# Patient Record
Sex: FEMALE | Race: WHITE | Employment: UNEMPLOYED | URBAN - METROPOLITAN AREA
[De-identification: names, ages, dates, MRNs, and addresses within clinical notes are randomized per-mention and may not be internally consistent; named-entity substitution may affect disease eponyms.]

---

## 2021-07-14 ENCOUNTER — HOSPITAL ENCOUNTER (OUTPATIENT)
Age: 36
Discharge: HOME | End: 2021-07-14
Attending: STUDENT IN AN ORGANIZED HEALTH CARE EDUCATION/TRAINING PROGRAM | Admitting: OBSTETRICS & GYNECOLOGY
Payer: COMMERCIAL

## 2021-07-14 VITALS
HEIGHT: 67 IN | DIASTOLIC BLOOD PRESSURE: 72 MMHG | RESPIRATION RATE: 16 BRPM | TEMPERATURE: 98.4 F | HEART RATE: 104 BPM | WEIGHT: 235 LBS | SYSTOLIC BLOOD PRESSURE: 123 MMHG | BODY MASS INDEX: 36.88 KG/M2

## 2021-07-14 PROBLEM — Z3A.21 21 WEEKS GESTATION OF PREGNANCY: Status: ACTIVE | Noted: 2021-07-14

## 2021-07-14 PROCEDURE — 99282 EMERGENCY DEPT VISIT SF MDM: CPT

## 2021-07-14 RX ORDER — GUAIFENESIN 100 MG/5ML
81 LIQUID (ML) ORAL DAILY
COMMUNITY

## 2021-07-14 NOTE — PROGRESS NOTES
1620  @ 21.2 weeks arrived from home with c/o lower left abdominal pain when walking and standing. To bathroom to void, ambulated to bed and connected to EFM. [address]. Morrison Given at nurses station, informed of pt complaint, discharge orders received. [id number] Discharge instructions given, pt verbalized understanding.

## 2021-07-14 NOTE — DISCHARGE INSTRUCTIONS
Patient armband removed and shredded      Patient Education        Weeks 18 to 22 of Your Pregnancy: Care Instructions  Overview     Your baby is continuing to develop quickly. Sometime between 18 and 22 weeks, you'll probably start to feel your baby move. At first, these small fetal movements feel like fluttering or \"butterflies. \" Some women say that they feel like gas bubbles. As your baby grows, these movements will become stronger. You may also notice that your baby hiccups. Babies at this stage can now suck their thumbs. You may find that your nausea and fatigue are gone. You may feel better overall and have more energy than you did in your first trimester. But you might now also have some new discomforts, like sleep problems or leg cramps. Talk to your doctor about things you can do at home to ease these problems. Follow-up care is a key part of your treatment and safety. Be sure to make and go to all appointments, and call your doctor if you are having problems. It's also a good idea to know your test results and keep a list of the medicines you take. How can you care for yourself at home? Ease sleep problems  · Avoid caffeine in drinks or chocolate late in the day. · Get some exercise every day. · Take a warm shower or bath before bed. · Have a light snack or glass of milk at bedtime. · Do relaxation exercises in bed to calm your mind and body. · Support your legs and back with extra pillows. Try a pillow between your legs if you sleep on your side. · Do not use sleeping pills or alcohol. They could harm your baby. Ease leg cramps  · Do not massage your calf during the cramp. · Sit on a firm bed or chair. Straighten your leg, and bend your foot (flex your ankle) slowly upward, toward your knee. Bend your toes up and down. · Stand on a cool, flat surface. Stretch your toes upward, and take small steps walking on your heels.   · Use a heating pad or hot water bottle to help with muscle ache.  Prevent leg cramps  · Be sure to get enough calcium. If you are worried that you are not getting enough, talk to your doctor. · Exercise every day, and stretch your legs before bed. · Take a warm bath before bed, and try leg warmers at night. Where can you learn more? Go to http://www.ivy.com/  Enter Q846 in the search box to learn more about \"Weeks 18 to 22 of Your Pregnancy: Care Instructions. \"  Current as of: October 8, 2020               Content Version: 12.8  © 0356-9583 [employer]. Care instructions adapted under license by [employer] (which disclaims liability or warranty for this information). If you have questions about a medical condition or this instruction, always ask your healthcare professional. [address] any warranty or liability for your use of this information.

## 2021-11-29 ENCOUNTER — HOSPITAL ENCOUNTER (INPATIENT)
Age: 36
LOS: 4 days | Discharge: HOME | End: 2021-12-03
Attending: OBSTETRICS & GYNECOLOGY | Admitting: OBSTETRICS & GYNECOLOGY
Payer: COMMERCIAL

## 2021-11-29 PROBLEM — Z3A.41 41 WEEKS GESTATION OF PREGNANCY: Status: ACTIVE | Noted: 2021-11-29

## 2021-11-29 PROBLEM — O48.0 41 WEEKS GESTATION OF PREGNANCY: Status: ACTIVE | Noted: 2021-11-29

## 2021-11-29 PROBLEM — E66.9 OBESITY: Status: ACTIVE | Noted: 2021-11-29

## 2021-11-29 PROBLEM — Z82.69 FAMILY HISTORY OF SYSTEMIC LUPUS ERYTHEMATOSUS: Status: ACTIVE | Noted: 2021-11-29

## 2021-11-29 LAB
ABO + RH BLD: NORMAL
ALBUMIN SERPL-MCNC: 2.4 G/DL (ref 3.4–5)
ALBUMIN/GLOB SERPL: 0.6 {RATIO} (ref 0.8–1.7)
ALP SERPL-CCNC: 105 U/L (ref 45–117)
ALT SERPL-CCNC: 25 U/L (ref 13–56)
ANION GAP SERPL CALC-SCNC: 9 MMOL/L (ref 3–18)
AST SERPL-CCNC: 21 U/L (ref 10–38)
BASOPHILS # BLD: 0 K/UL (ref 0–0.1)
BASOPHILS NFR BLD: 0 % (ref 0–2)
BILIRUB SERPL-MCNC: 0.2 MG/DL (ref 0.2–1)
BLOOD GROUP ANTIBODIES SERPL: NORMAL
BUN SERPL-MCNC: 12 MG/DL (ref 7–18)
BUN/CREAT SERPL: 16 (ref 12–20)
CALCIUM SERPL-MCNC: 9 MG/DL (ref 8.5–10.1)
CHLORIDE SERPL-SCNC: 107 MMOL/L (ref 100–111)
CO2 SERPL-SCNC: 23 MMOL/L (ref 21–32)
CREAT SERPL-MCNC: 0.75 MG/DL (ref 0.6–1.3)
DIFFERENTIAL METHOD BLD: ABNORMAL
EOSINOPHIL # BLD: 0.1 K/UL (ref 0–0.4)
EOSINOPHIL NFR BLD: 1 % (ref 0–5)
ERYTHROCYTE [DISTWIDTH] IN BLOOD BY AUTOMATED COUNT: 13.7 % (ref 11.6–14.5)
GLOBULIN SER CALC-MCNC: 3.9 G/DL (ref 2–4)
GLUCOSE SERPL-MCNC: 84 MG/DL (ref 74–99)
HCT VFR BLD AUTO: 36.6 % (ref 35–45)
HGB BLD-MCNC: 12.2 G/DL (ref 12–16)
IMM GRANULOCYTES # BLD AUTO: 0.1 K/UL (ref 0–0.04)
IMM GRANULOCYTES NFR BLD AUTO: 1 % (ref 0–0.5)
LYMPHOCYTES # BLD: 1.6 K/UL (ref 0.9–3.6)
LYMPHOCYTES NFR BLD: 14 % (ref 21–52)
MCH RBC QN AUTO: 31.9 PG (ref 24–34)
MCHC RBC AUTO-ENTMCNC: 33.3 G/DL (ref 31–37)
MCV RBC AUTO: 95.8 FL (ref 78–100)
MONOCYTES # BLD: 0.5 K/UL (ref 0.05–1.2)
MONOCYTES NFR BLD: 5 % (ref 3–10)
NEUTS SEG # BLD: 8.7 K/UL (ref 1.8–8)
NEUTS SEG NFR BLD: 79 % (ref 40–73)
NRBC # BLD: 0 K/UL (ref 0–0.01)
NRBC BLD-RTO: 0 PER 100 WBC
PLATELET # BLD AUTO: 186 K/UL (ref 135–420)
PMV BLD AUTO: 11.5 FL (ref 9.2–11.8)
POTASSIUM SERPL-SCNC: 4.2 MMOL/L (ref 3.5–5.5)
PROT SERPL-MCNC: 6.3 G/DL (ref 6.4–8.2)
RBC # BLD AUTO: 3.82 M/UL (ref 4.2–5.3)
SODIUM SERPL-SCNC: 139 MMOL/L (ref 136–145)
SPECIMEN EXP DATE BLD: NORMAL
WBC # BLD AUTO: 10.9 K/UL (ref 4.6–13.2)

## 2021-11-29 PROCEDURE — 74011000258 HC RX REV CODE- 258: Performed by: OBSTETRICS & GYNECOLOGY

## 2021-11-29 PROCEDURE — 65270000029 HC RM PRIVATE

## 2021-11-29 PROCEDURE — 86901 BLOOD TYPING SEROLOGIC RH(D): CPT

## 2021-11-29 PROCEDURE — 85025 COMPLETE CBC W/AUTO DIFF WBC: CPT

## 2021-11-29 PROCEDURE — 80053 COMPREHEN METABOLIC PANEL: CPT

## 2021-11-29 PROCEDURE — 77030028565 HC CATH CERV RIPNG BLN COOK -B

## 2021-11-29 PROCEDURE — 74011250636 HC RX REV CODE- 250/636: Performed by: OBSTETRICS & GYNECOLOGY

## 2021-11-29 RX ORDER — OXYTOCIN/0.9 % SODIUM CHLORIDE 30/500 ML
0-20 PLASTIC BAG, INJECTION (ML) INTRAVENOUS
Status: DISCONTINUED | OUTPATIENT
Start: 2021-11-29 | End: 2021-11-30

## 2021-11-29 RX ORDER — OXYTOCIN/0.9 % SODIUM CHLORIDE 30/500 ML
0-20 PLASTIC BAG, INJECTION (ML) INTRAVENOUS
Status: DISCONTINUED | OUTPATIENT
Start: 2021-11-29 | End: 2021-11-29

## 2021-11-29 RX ORDER — HYDROMORPHONE HYDROCHLORIDE 1 MG/ML
1 INJECTION, SOLUTION INTRAMUSCULAR; INTRAVENOUS; SUBCUTANEOUS
Status: DISCONTINUED | OUTPATIENT
Start: 2021-11-29 | End: 2021-12-01 | Stop reason: HOSPADM

## 2021-11-29 RX ORDER — TERBUTALINE SULFATE 1 MG/ML
0.25 INJECTION SUBCUTANEOUS
Status: DISCONTINUED | OUTPATIENT
Start: 2021-11-29 | End: 2021-12-01 | Stop reason: HOSPADM

## 2021-11-29 RX ORDER — MINERAL OIL
30 OIL (ML) MISCELLANEOUS AS NEEDED
Status: DISCONTINUED | OUTPATIENT
Start: 2021-11-29 | End: 2021-12-01 | Stop reason: HOSPADM

## 2021-11-29 RX ORDER — OXYTOCIN/RINGER'S LACTATE 30/500 ML
10 PLASTIC BAG, INJECTION (ML) INTRAVENOUS AS NEEDED
Status: DISCONTINUED | OUTPATIENT
Start: 2021-11-29 | End: 2021-12-01 | Stop reason: HOSPADM

## 2021-11-29 RX ORDER — LIDOCAINE HYDROCHLORIDE 10 MG/ML
20 INJECTION, SOLUTION EPIDURAL; INFILTRATION; INTRACAUDAL; PERINEURAL AS NEEDED
Status: DISCONTINUED | OUTPATIENT
Start: 2021-11-29 | End: 2021-12-01 | Stop reason: HOSPADM

## 2021-11-29 RX ORDER — OXYTOCIN/0.9 % SODIUM CHLORIDE 30/500 ML
87.3 PLASTIC BAG, INJECTION (ML) INTRAVENOUS AS NEEDED
Status: COMPLETED | OUTPATIENT
Start: 2021-11-29 | End: 2021-12-01

## 2021-11-29 RX ORDER — NALBUPHINE HYDROCHLORIDE 10 MG/ML
10 INJECTION, SOLUTION INTRAMUSCULAR; INTRAVENOUS; SUBCUTANEOUS
Status: DISCONTINUED | OUTPATIENT
Start: 2021-11-29 | End: 2021-12-01 | Stop reason: HOSPADM

## 2021-11-29 RX ORDER — BUTORPHANOL TARTRATE 2 MG/ML
2 INJECTION INTRAMUSCULAR; INTRAVENOUS
Status: DISCONTINUED | OUTPATIENT
Start: 2021-11-29 | End: 2021-12-01 | Stop reason: HOSPADM

## 2021-11-29 RX ORDER — METHYLERGONOVINE MALEATE 0.2 MG/ML
0.2 INJECTION INTRAVENOUS AS NEEDED
Status: DISCONTINUED | OUTPATIENT
Start: 2021-11-29 | End: 2021-12-01 | Stop reason: HOSPADM

## 2021-11-29 RX ORDER — SODIUM CHLORIDE, SODIUM LACTATE, POTASSIUM CHLORIDE, CALCIUM CHLORIDE 600; 310; 30; 20 MG/100ML; MG/100ML; MG/100ML; MG/100ML
125 INJECTION, SOLUTION INTRAVENOUS CONTINUOUS
Status: DISCONTINUED | OUTPATIENT
Start: 2021-11-29 | End: 2021-12-01 | Stop reason: HOSPADM

## 2021-11-29 RX ORDER — ONDANSETRON 2 MG/ML
4 INJECTION INTRAMUSCULAR; INTRAVENOUS
Status: DISCONTINUED | OUTPATIENT
Start: 2021-11-29 | End: 2021-12-01 | Stop reason: HOSPADM

## 2021-11-29 RX ADMIN — SODIUM CHLORIDE, POTASSIUM CHLORIDE, SODIUM LACTATE AND CALCIUM CHLORIDE 125 ML/HR: 600; 310; 30; 20 INJECTION, SOLUTION INTRAVENOUS at 09:17

## 2021-11-29 RX ADMIN — SODIUM CHLORIDE 2.5 MILLION UNITS: 9 INJECTION, SOLUTION INTRAVENOUS at 18:16

## 2021-11-29 RX ADMIN — SODIUM CHLORIDE 5 MILLION UNITS: 900 INJECTION INTRAVENOUS at 09:13

## 2021-11-29 RX ADMIN — Medication 6 MILLI-UNITS/MIN: at 10:13

## 2021-11-29 RX ADMIN — SODIUM CHLORIDE, POTASSIUM CHLORIDE, SODIUM LACTATE AND CALCIUM CHLORIDE 125 ML/HR: 600; 310; 30; 20 INJECTION, SOLUTION INTRAVENOUS at 15:28

## 2021-11-29 RX ADMIN — SODIUM CHLORIDE, POTASSIUM CHLORIDE, SODIUM LACTATE AND CALCIUM CHLORIDE 125 ML/HR: 600; 310; 30; 20 INJECTION, SOLUTION INTRAVENOUS at 14:20

## 2021-11-29 RX ADMIN — SODIUM CHLORIDE 2.5 MILLION UNITS: 9 INJECTION, SOLUTION INTRAVENOUS at 22:24

## 2021-11-29 RX ADMIN — SODIUM CHLORIDE 2.5 MILLION UNITS: 9 INJECTION, SOLUTION INTRAVENOUS at 13:37

## 2021-11-29 NOTE — PROGRESS NOTES
Pt presents to unit for scheduled induction of labor. Taken to room 8 for admission. 1020- Charting reviewed and discussed for LESLIE Eng RN.     6312- Message left with Dr. Melissa Banegas for review of EFM prior to restarting Pitocin. 1910- Bedside and Verbal shift change report given to GILSON Domínguez RN (oncoming nurse) by Kaye Rojas RN and LESLIE Eng RN (offgoing nurse). Report included the following information SBAR, Intake/Output, MAR and Recent Results.

## 2021-11-29 NOTE — PROGRESS NOTES
Cx=1-2 cm, still high  40%    Attempted to place ALT-DIETMANNS cath but I think it is pushing through cx after internal ballon  Unsuccessful    Will run low dose pit overnight    JONATHAN Buitrago

## 2021-11-29 NOTE — H&P
Ostetrical History and Physical    Subjective:     Date of Admission: 2021    Patient is a 43 y.o.   female admitted with 39 w preg. FH SLE and she was pos for 2/3 tests. Treated with baby ASA thru preg. Obesity. Treated for syphillis early preg, NOT allergic to penicillin by testing. .60% growth last US    For Obstetric history, see ronniantal.    For Review of Systems, see prenatal    Past Medical History:   Diagnosis Date    Abnormal Papanicolaou smear of cervix     Heart abnormality     heart murmur      No past surgical history on file. Prior to Admission medications    Medication Sig Start Date End Date Taking? Authorizing Provider   prenatal vit/iron fum/folic ac (PRENATAL 1+1 PO) Take 1 Tablet by mouth daily. Yes Provider, Historical   aspirin 81 mg chewable tablet Take 81 mg by mouth daily. Yes Provider, Historical     Allergies   Allergen Reactions    Codeine Other (comments)     Cannot urinate    Pcn [Penicillins] Hives     Pt states not allergic      Social History     Tobacco Use    Smoking status: Never Smoker    Smokeless tobacco: Never Used   Substance Use Topics    Alcohol use: Not Currently      No family history on file. Objective:     Blood pressure (!) 132/90, pulse 86, temperature 98.1 °F (36.7 °C), resp. rate 16, height 5' 7\" (1.702 m), weight 119.3 kg (263 lb), SpO2 98 %. Temp (24hrs), Av.1 °F (36.7 °C), Min:98.1 °F (36.7 °C), Max:98.1 °F (36.7 °C)        No intake/output data recorded. No intake/output data recorded. HEENT: No pallor, no jaundice, Thyroid and throat normal  RESPIRATORY: Clear to A & P  CVS: pulse reg, HS normal  ABDOMEN: Gravid. Vertex. EFW=8lb +-1lb. No abnormal tenderness. Pelvic: Cervix 1,   Effaced: 10%  Station:  -3  Data Review:   No results found for this or any previous visit (from the past 24 hour(s)). Monitor:  Reactivity:present Variability:present Baseline:within normal limits. Single late decel Uncertin signif.  Will use pit for induction therefore. Assessment:     Active Problems:    41 weeks gestation of pregnancy (11/29/2021)      Obesity (11/29/2021)      Family history of systemic lupus erythematosus (11/29/2021)        Plan:   Pit induction    Check labs:  CBC  CMP  Check  Prenatal:    Disposition:     Type of admit:In-Patient    I saw and examined patient.     Signed By: Rosaura Puckett MD                         November 29, 2021

## 2021-11-29 NOTE — PROGRESS NOTES
0745: Admission assessment completed. 0800: Dr. Horvath Form at bedside. 0935: Pt turned to left side. 1015: EFM readjusted, pitocin started. 1100: Pt up to bathroom. Turned to right side, monitors readjusted. 1320: Pt on labor ball. [place] and EFM readjusted. 1334: Pt back in bed on right side. 1345: Pitocin stopped. IV bolus started. 1528: Pitocin restarted at 4mg per MD orders. Pt repositioned. 1745: Dr. Horvath Form at bedside. Attempted to place cook balloon. 1800: Plan to start Pitocin at 4-6mg overnight and reassess. 1845: Pt eating dinner. 1920: Bedside and Verbal shift change report given to GILSON Domínguez RN (oncoming nurse) by Rodo Bustamante. TABATHA Rojas and LESLIE Roach RN (offgoing nurse). Report included the following information SBAR, Intake/Output, MAR and Recent Results.

## 2021-11-30 PROCEDURE — 77030028565 HC CATH CERV RIPNG BLN COOK -B

## 2021-11-30 PROCEDURE — 75410000002 HC LABOR FEE PER 1 HR

## 2021-11-30 PROCEDURE — 74011000258 HC RX REV CODE- 258: Performed by: OBSTETRICS & GYNECOLOGY

## 2021-11-30 PROCEDURE — 65270000029 HC RM PRIVATE

## 2021-11-30 PROCEDURE — 59200 INSERT CERVICAL DILATOR: CPT

## 2021-11-30 PROCEDURE — 74011250636 HC RX REV CODE- 250/636: Performed by: OBSTETRICS & GYNECOLOGY

## 2021-11-30 PROCEDURE — 74011250637 HC RX REV CODE- 250/637: Performed by: OBSTETRICS & GYNECOLOGY

## 2021-11-30 RX ORDER — OXYTOCIN/0.9 % SODIUM CHLORIDE 30/500 ML
2 PLASTIC BAG, INJECTION (ML) INTRAVENOUS
Status: DISCONTINUED | OUTPATIENT
Start: 2021-12-01 | End: 2021-12-01

## 2021-11-30 RX ORDER — TERCONAZOLE 4 MG/G
1 CREAM VAGINAL DAILY
Status: DISCONTINUED | OUTPATIENT
Start: 2021-11-30 | End: 2021-12-02

## 2021-11-30 RX ADMIN — SODIUM CHLORIDE, POTASSIUM CHLORIDE, SODIUM LACTATE AND CALCIUM CHLORIDE 500 ML: 600; 310; 30; 20 INJECTION, SOLUTION INTRAVENOUS at 15:12

## 2021-11-30 RX ADMIN — SODIUM CHLORIDE 2.5 MILLION UNITS: 9 INJECTION, SOLUTION INTRAVENOUS at 05:43

## 2021-11-30 RX ADMIN — SODIUM CHLORIDE, POTASSIUM CHLORIDE, SODIUM LACTATE AND CALCIUM CHLORIDE 125 ML/HR: 600; 310; 30; 20 INJECTION, SOLUTION INTRAVENOUS at 01:35

## 2021-11-30 RX ADMIN — SODIUM CHLORIDE, POTASSIUM CHLORIDE, SODIUM LACTATE AND CALCIUM CHLORIDE 125 ML/HR: 600; 310; 30; 20 INJECTION, SOLUTION INTRAVENOUS at 16:37

## 2021-11-30 RX ADMIN — TERCONAZOLE 1 APPLICATOR: 4 CREAM VAGINAL at 09:33

## 2021-11-30 RX ADMIN — SODIUM CHLORIDE 2.5 MILLION UNITS: 9 INJECTION, SOLUTION INTRAVENOUS at 01:41

## 2021-11-30 RX ADMIN — SODIUM CHLORIDE 2.5 MILLION UNITS: 9 INJECTION, SOLUTION INTRAVENOUS at 19:14

## 2021-11-30 RX ADMIN — BUTORPHANOL TARTRATE 2 MG: 2 INJECTION, SOLUTION INTRAMUSCULAR; INTRAVENOUS at 08:25

## 2021-11-30 RX ADMIN — BUTORPHANOL TARTRATE 2 MG: 2 INJECTION, SOLUTION INTRAMUSCULAR; INTRAVENOUS at 14:51

## 2021-11-30 RX ADMIN — SODIUM CHLORIDE 2.5 MILLION UNITS: 9 INJECTION, SOLUTION INTRAVENOUS at 10:41

## 2021-11-30 RX ADMIN — SODIUM CHLORIDE, POTASSIUM CHLORIDE, SODIUM LACTATE AND CALCIUM CHLORIDE 125 ML/HR: 600; 310; 30; 20 INJECTION, SOLUTION INTRAVENOUS at 10:23

## 2021-11-30 RX ADMIN — SODIUM CHLORIDE 2.5 MILLION UNITS: 9 INJECTION, SOLUTION INTRAVENOUS at 14:49

## 2021-11-30 NOTE — PROGRESS NOTES
Problem: Patient Education: Go to Patient Education Activity  Goal: Patient/Family Education  Outcome: Progressing Towards Goal     Problem: Vaginal Delivery: Day of Deliver-Laboring  Goal: Off Pathway (Use only if patient is Off Pathway)  Outcome: Progressing Towards Goal  Goal: Activity/Safety  Outcome: Progressing Towards Goal  Goal: Consults, if ordered  Outcome: Progressing Towards Goal  Goal: Diagnostic Test/Procedures  Outcome: Progressing Towards Goal  Goal: Nutrition/Diet  Outcome: Progressing Towards Goal  Goal: Discharge Planning  Outcome: Progressing Towards Goal  Goal: Medications  Outcome: Progressing Towards Goal  Goal: Respiratory  Outcome: Progressing Towards Goal  Goal: Treatments/Interventions/Procedures  Outcome: Progressing Towards Goal  Goal: *Vital signs within defined limits  Outcome: Progressing Towards Goal  Goal: *Labs within defined limits  Outcome: Progressing Towards Goal  Goal: *Hemodynamically stable  Outcome: Progressing Towards Goal  Goal: *Optimal pain control at patient's stated goal  Outcome: Progressing Towards Goal     Problem: Pain  Goal: *Control of Pain  Outcome: Progressing Towards Goal

## 2021-11-30 NOTE — PROGRESS NOTES
Bedside and Verbal shift change report given to NANNETTE Rojas RN and LESLIE Manzanares (oncoming nurse) by Isabelle Silva RN (offgoing nurse). Report included the following information SBAR, Intake/Output, MAR and Recent Results. 0720: Shift assessment completed. Pt repositioned to high fowlers. 0815: Dr. Magda Evans at bedside     0825: Stadol given    0830: Roland Specter balloon placed. Pt repositioned on left side with peanut ball. 0930: Pt up to restroom. 0945: Pt back in bed, positioned on left side. 1045: Pt up to bathroom, able to void without deflating the vaginal balloon. 1052: Pt assisted into hands and knees position on bed. [place] adjusted. 1115: Repositioned onto right side. EFM adjusted. 1250: Pt turned to left side. Monitors adjusted. 1330: Pt sitting up in chair. Monitors adjusted. 1400: Back in bed on right side. Heat pad applied to lower back. 1425: Pt in hands and knees position on bed. EFM and [place] adjusted.

## 2021-11-30 NOTE — PROGRESS NOTES
Bedside and Verbal shift change report given to NANNETTE Rojas RN and LESLIE Jose (oncoming nurse) by Jennifer Griffith RN (offgoing nurse). Report included the following information SBAR, Intake/Output, MAR and Recent Results. 9075- Charting reviewed and discussed for MAINELottie Rickettsiamarlon, [address]- Pt unable to void initially. Tension released and 15ml deflated from vaginal balloon. Now able to void and re inflated/resecured once back in bed. 1512- Decelerations noted. Pitocin off, IV bolus infusing and patient turned right lateral side. 1915- Bedside and Verbal shift change report given to NAA Padilla RN (oncoming nurse) by Cristina Rojas RN (offgoing nurse). Report included the following information SBAR, Intake/Output, MAR and Recent Results.

## 2021-11-30 NOTE — PROGRESS NOTES
Bedside and Verbal shift change report given to Gen Martins RN   (oncoming nurse) by LESLIE Ferreira and NANNETTE Rojas RN (offgoing nurse). Report included the following information SBAR, Kardex, Procedure Summary, Intake/Output, MAR and Recent Results. 1930 Assessment completed. 2039 Pitocin started. 2225 Pt ambulated to bathroom to void. 18 Pt assisted to right lateral side. 2 blankets placed over the hole in the bed.      2330 Dr. Bev Mirza on phone. Informed her of decelerations. Orders received to titrate Pitocin as necessary. 0120 Pt ambulated to batrhoom to void. 0127 Pt returned to bed. Pt requests to sit up. PT assisted to high hairston's position with bottom of bed lowered. 1754 Late decelerations noted. Pt better assisted in high hairston's and a wedge placed behind right hip.     0329 Pitocin decreased to 2 brandee-units and patient assisted to right lateral side    0350 EFM and toco readjusted. 5327 Dr. Vimal Carreon called for update. Informed her of SVE, pitocin dosages throughout the night. Contractions difficult to  with patient on side. Orders received to continue to go up on Pitocin. Dr. Vimal Carreon will be in around 8 am to try to put in cook balloon. [address] [place] readjusted    0650 Pt ambulated to bathroom to void. 5621 PT requests to clean face at sink. 0704 Pt back in bed on monitor. 2664 Ultrasound readjusted. 0715 Bedside and Verbal shift change report given to NANNETTE Rojas RN and LESLIE Wilson (oncoming nurse) by Gen Martins RN   (offgoing nurse). Report included the following information SBAR, Kardex, Procedure Summary, Intake/Output, MAR and Recent Results.

## 2021-11-30 NOTE — PROGRESS NOTES
Pelvic exam:  Cervix1, Effaced:50%Station:-3 cook balloon placed pt on 6 pitocin will contine .  fht stable with accels for hours now, some ctx every 2 min

## 2021-12-01 ENCOUNTER — ANESTHESIA EVENT (OUTPATIENT)
Dept: LABOR AND DELIVERY | Age: 36
End: 2021-12-01
Payer: COMMERCIAL

## 2021-12-01 ENCOUNTER — ANESTHESIA (OUTPATIENT)
Dept: LABOR AND DELIVERY | Age: 36
End: 2021-12-01
Payer: COMMERCIAL

## 2021-12-01 PROCEDURE — 75410000003 HC RECOV DEL/VAG/CSECN EA 0.5 HR

## 2021-12-01 PROCEDURE — 74011000258 HC RX REV CODE- 258: Performed by: OBSTETRICS & GYNECOLOGY

## 2021-12-01 PROCEDURE — 75410000000 HC DELIVERY VAGINAL/SINGLE

## 2021-12-01 PROCEDURE — 77010026065 HC OXYGEN MINIMUM MEDICAL AIR

## 2021-12-01 PROCEDURE — 74011250637 HC RX REV CODE- 250/637: Performed by: OBSTETRICS & GYNECOLOGY

## 2021-12-01 PROCEDURE — 65270000029 HC RM PRIVATE

## 2021-12-01 PROCEDURE — 77030007879 HC KT SPN EPDRL TELE -B: Performed by: ANESTHESIOLOGY

## 2021-12-01 PROCEDURE — 74011000250 HC RX REV CODE- 250: Performed by: ANESTHESIOLOGY

## 2021-12-01 PROCEDURE — 76060000078 HC EPIDURAL ANESTHESIA

## 2021-12-01 PROCEDURE — 74011000258 HC RX REV CODE- 258

## 2021-12-01 PROCEDURE — 74011250636 HC RX REV CODE- 250/636: Performed by: STUDENT IN AN ORGANIZED HEALTH CARE EDUCATION/TRAINING PROGRAM

## 2021-12-01 PROCEDURE — 74011250636 HC RX REV CODE- 250/636

## 2021-12-01 PROCEDURE — 4A1HXCZ MONITORING OF PRODUCTS OF CONCEPTION, CARDIAC RATE, EXTERNAL APPROACH: ICD-10-PCS | Performed by: OBSTETRICS & GYNECOLOGY

## 2021-12-01 PROCEDURE — 75410000002 HC LABOR FEE PER 1 HR

## 2021-12-01 PROCEDURE — 77030040830 HC CATH URETH FOL MDII -A

## 2021-12-01 PROCEDURE — 74011250636 HC RX REV CODE- 250/636: Performed by: OBSTETRICS & GYNECOLOGY

## 2021-12-01 PROCEDURE — 74011250636 HC RX REV CODE- 250/636: Performed by: ANESTHESIOLOGY

## 2021-12-01 PROCEDURE — 0UQKXZZ REPAIR HYMEN, EXTERNAL APPROACH: ICD-10-PCS | Performed by: OBSTETRICS & GYNECOLOGY

## 2021-12-01 RX ORDER — SODIUM CHLORIDE 0.9 % (FLUSH) 0.9 %
5-40 SYRINGE (ML) INJECTION AS NEEDED
Status: DISCONTINUED | OUTPATIENT
Start: 2021-12-01 | End: 2021-12-01 | Stop reason: HOSPADM

## 2021-12-01 RX ORDER — FENTANYL CITRATE 50 UG/ML
INJECTION, SOLUTION INTRAMUSCULAR; INTRAVENOUS AS NEEDED
Status: DISCONTINUED | OUTPATIENT
Start: 2021-12-01 | End: 2021-12-01 | Stop reason: HOSPADM

## 2021-12-01 RX ORDER — BUPIVACAINE HYDROCHLORIDE 2.5 MG/ML
INJECTION, SOLUTION EPIDURAL; INFILTRATION; INTRACAUDAL AS NEEDED
Status: DISCONTINUED | OUTPATIENT
Start: 2021-12-01 | End: 2021-12-01 | Stop reason: HOSPADM

## 2021-12-01 RX ORDER — FENTANYL/ROPIVACAINE/NS/PF 2MCG/ML-.1
PLASTIC BAG, INJECTION (ML) EPIDURAL
Status: COMPLETED
Start: 2021-12-01 | End: 2021-12-01

## 2021-12-01 RX ORDER — PHENYLEPHRINE HCL IN 0.9% NACL 1 MG/10 ML
80 SYRINGE (ML) INTRAVENOUS AS NEEDED
Status: DISCONTINUED | OUTPATIENT
Start: 2021-12-01 | End: 2021-12-01 | Stop reason: HOSPADM

## 2021-12-01 RX ORDER — FENTANYL/ROPIVACAINE/NS/PF 2MCG/ML-.1
1-15 PLASTIC BAG, INJECTION (ML) EPIDURAL
Status: DISCONTINUED | OUTPATIENT
Start: 2021-12-01 | End: 2021-12-01 | Stop reason: HOSPADM

## 2021-12-01 RX ORDER — OXYCODONE AND ACETAMINOPHEN 5; 325 MG/1; MG/1
2 TABLET ORAL
Status: DISCONTINUED | OUTPATIENT
Start: 2021-12-01 | End: 2021-12-03 | Stop reason: HOSPADM

## 2021-12-01 RX ORDER — IBUPROFEN 400 MG/1
800 TABLET ORAL
Status: DISCONTINUED | OUTPATIENT
Start: 2021-12-01 | End: 2021-12-03 | Stop reason: HOSPADM

## 2021-12-01 RX ORDER — AMOXICILLIN 250 MG
1 CAPSULE ORAL
Status: DISCONTINUED | OUTPATIENT
Start: 2021-12-01 | End: 2021-12-03 | Stop reason: HOSPADM

## 2021-12-01 RX ORDER — ACETAMINOPHEN 325 MG/1
650 TABLET ORAL
Status: DISCONTINUED | OUTPATIENT
Start: 2021-12-01 | End: 2021-12-03 | Stop reason: HOSPADM

## 2021-12-01 RX ORDER — FENTANYL/ROPIVACAINE/NS/PF 2MCG/ML-.1
PLASTIC BAG, INJECTION (ML) EPIDURAL
Status: DISPENSED
Start: 2021-12-01 | End: 2021-12-02

## 2021-12-01 RX ORDER — OXYTOCIN/0.9 % SODIUM CHLORIDE 30/500 ML
0-20 PLASTIC BAG, INJECTION (ML) INTRAVENOUS
Status: DISCONTINUED | OUTPATIENT
Start: 2021-12-01 | End: 2021-12-02

## 2021-12-01 RX ORDER — NALOXONE HYDROCHLORIDE 0.4 MG/ML
0.2 INJECTION, SOLUTION INTRAMUSCULAR; INTRAVENOUS; SUBCUTANEOUS AS NEEDED
Status: DISCONTINUED | OUTPATIENT
Start: 2021-12-01 | End: 2021-12-01 | Stop reason: HOSPADM

## 2021-12-01 RX ORDER — LANOLIN ALCOHOL/MO/W.PET/CERES
3 CREAM (GRAM) TOPICAL
Status: DISCONTINUED | OUTPATIENT
Start: 2021-12-01 | End: 2021-12-03 | Stop reason: HOSPADM

## 2021-12-01 RX ORDER — SODIUM CHLORIDE 0.9 % (FLUSH) 0.9 %
5-40 SYRINGE (ML) INJECTION EVERY 8 HOURS
Status: DISCONTINUED | OUTPATIENT
Start: 2021-12-01 | End: 2021-12-01 | Stop reason: HOSPADM

## 2021-12-01 RX ORDER — LIDOCAINE HYDROCHLORIDE AND EPINEPHRINE 15; 5 MG/ML; UG/ML
INJECTION, SOLUTION EPIDURAL AS NEEDED
Status: DISCONTINUED | OUTPATIENT
Start: 2021-12-01 | End: 2021-12-01 | Stop reason: HOSPADM

## 2021-12-01 RX ORDER — LIDOCAINE HYDROCHLORIDE 10 MG/ML
INJECTION INFILTRATION; PERINEURAL AS NEEDED
Status: DISCONTINUED | OUTPATIENT
Start: 2021-12-01 | End: 2021-12-01 | Stop reason: HOSPADM

## 2021-12-01 RX ORDER — PROMETHAZINE HYDROCHLORIDE 25 MG/ML
25 INJECTION, SOLUTION INTRAMUSCULAR; INTRAVENOUS
Status: DISCONTINUED | OUTPATIENT
Start: 2021-12-01 | End: 2021-12-03 | Stop reason: HOSPADM

## 2021-12-01 RX ORDER — LIDOCAINE HYDROCHLORIDE 10 MG/ML
10 INJECTION INFILTRATION; PERINEURAL ONCE
Status: ACTIVE | OUTPATIENT
Start: 2021-12-01 | End: 2021-12-02

## 2021-12-01 RX ORDER — FENTANYL CITRATE 50 UG/ML
INJECTION, SOLUTION INTRAMUSCULAR; INTRAVENOUS
Status: COMPLETED
Start: 2021-12-01 | End: 2021-12-01

## 2021-12-01 RX ORDER — MINERAL OIL
OIL (ML) MISCELLANEOUS
Status: DISCONTINUED | OUTPATIENT
Start: 2021-12-01 | End: 2021-12-03 | Stop reason: HOSPADM

## 2021-12-01 RX ADMIN — SODIUM CHLORIDE, POTASSIUM CHLORIDE, SODIUM LACTATE AND CALCIUM CHLORIDE 125 ML/HR: 600; 310; 30; 20 INJECTION, SOLUTION INTRAVENOUS at 05:18

## 2021-12-01 RX ADMIN — LIDOCAINE HYDROCHLORIDE 3 ML: 10 INJECTION, SOLUTION INFILTRATION; PERINEURAL at 11:07

## 2021-12-01 RX ADMIN — SODIUM CHLORIDE 2.5 MILLION UNITS: 9 INJECTION, SOLUTION INTRAVENOUS at 06:20

## 2021-12-01 RX ADMIN — Medication 87.3 MILLI-UNITS/MIN: at 19:20

## 2021-12-01 RX ADMIN — IBUPROFEN 800 MG: 400 TABLET, FILM COATED ORAL at 19:56

## 2021-12-01 RX ADMIN — BUPIVACAINE HYDROCHLORIDE 8 ML: 2.5 INJECTION, SOLUTION EPIDURAL; INFILTRATION; INTRACAUDAL at 11:08

## 2021-12-01 RX ADMIN — FENTANYL CITRATE 100 MCG: 50 INJECTION, SOLUTION INTRAMUSCULAR; INTRAVENOUS at 11:10

## 2021-12-01 RX ADMIN — SODIUM CHLORIDE 2.5 MILLION UNITS: 9 INJECTION, SOLUTION INTRAVENOUS at 11:59

## 2021-12-01 RX ADMIN — ROPIVACAINE HYDROCHLORIDE 12 ML/HR: 5 INJECTION, SOLUTION EPIDURAL; INFILTRATION; PERINEURAL at 11:18

## 2021-12-01 RX ADMIN — SODIUM CHLORIDE 2.5 MILLION UNITS: 9 INJECTION, SOLUTION INTRAVENOUS at 16:21

## 2021-12-01 RX ADMIN — Medication 12 ML/HR: at 11:18

## 2021-12-01 RX ADMIN — LIDOCAINE HYDROCHLORIDE,EPINEPHRINE BITARTRATE 3 ML: 15; .005 INJECTION, SOLUTION EPIDURAL; INFILTRATION; INTRACAUDAL; PERINEURAL at 11:09

## 2021-12-01 RX ADMIN — Medication 2 MILLI-UNITS/MIN: at 05:18

## 2021-12-01 RX ADMIN — Medication 12 ML/HR: at 18:08

## 2021-12-01 NOTE — PROGRESS NOTES
1910- Bedside and Verbal shift change report given to TEAGAN Salazar, RN (oncoming nurse) by Patricia Rojas, RN (offgoing nurse). Report included the following information SBAR, Kardex, Intake/Output, MAR and Recent Results. 0- Dr. Danielito Alcantara MD at bedside to review plan of care. Plan to remove cook at 2030, pt to shower and eat, then start pitocin at 0400 starting at 2mu and increasing by 2mu q30min. 2030- Cook balloon removed. Pt up to shower and eat. 2305- Bedside and Verbal shift change report given to FEDERICA Rivera (oncoming nurse) by Mary Mitchell, TABATHA (offgoing nurse). Report included the following information SBAR, Kardex, Intake/Output, MAR and Recent Results. Opportunities for questions was provided.

## 2021-12-01 NOTE — PROGRESS NOTES
To patient room. Currently has CRB in, due to be removed at 2030. FHT reactive, however pit off at this time. Reviewed that she did have decelerations earlier when pit was on but baby looks well at this moment. Discussed option of starting pitocin after CRB is removed, or resting starting in the AM (relayed by RN staff that this was ok per Dr. Anita Noel). Patient elected to rest until AM. Plan to allow her to eat at this time, then start Pitocin at 4a.

## 2021-12-01 NOTE — ANESTHESIA PREPROCEDURE EVALUATION
Relevant Problems   ENDOCRINE   (+) Obesity       Anesthetic History   No history of anesthetic complications            Review of Systems / Medical History  Patient summary reviewed, nursing notes reviewed and pertinent labs reviewed    Pulmonary  Within defined limits            Pertinent negatives: No sleep apnea and smoker     Neuro/Psych   Within defined limits           Cardiovascular                       GI/Hepatic/Renal  Within defined limits              Endo/Other        Morbid obesity     Other Findings              Physical Exam    Airway  Mallampati: II  TM Distance: > 6 cm  Neck ROM: normal range of motion   Mouth opening: Normal     Cardiovascular    Rhythm: regular  Rate: normal         Dental      Comments: Crown fell out recently   Pulmonary  Breath sounds clear to auscultation               Abdominal  GI exam deferred       Other Findings            Anesthetic Plan    ASA: 3, emergent  Anesthesia type: epidural      Post-op pain plan if not by surgeon: indwelling epidural catheter      Anesthetic plan and risks discussed with: Patient

## 2021-12-01 NOTE — PROGRESS NOTES
[facility] [address] calls Dr. Tre Mejía with update regarding FHR pattern, CTX pattern, SVE 4/100/0, pitocin off, waiting for epidural, RN placing FSE. Tre Mejía wants pitocin off until after epidural, wait for epidural until patient stable. Catherine Bae RN updated

## 2021-12-01 NOTE — PROGRESS NOTES
Assisted in Epidural for patient    1100-positioning  1104- MD in room  1104-start procedure  1108-bolus  1109- test  1110- infusion    Patient tolerated well.  bp WNL

## 2021-12-01 NOTE — PROGRESS NOTES
2305- Bedside and Verbal shift change report given to FEDERICA Oquendo (oncoming nurse) by Phyllis Duvall RN  (offgoing nurse). Report included the following information SBAR, Kardex, Procedure Summary, Intake/Output, MAR, Accordion, Recent Results and Med Rec Status. All patient care assumed at this time. 900- Bedside and Verbal shift change report given to Montserrat Skinner RN (oncoming nurse) by FEDERICA Oquendo (offgoing nurse). Report included the following information SBAR, Kardex, Procedure Summary, Intake/Output, MAR, Accordion, Recent Results and Med Rec Status. All patient care relinquished at this time.

## 2021-12-01 NOTE — PROGRESS NOTES
Pelvic exam:  Cervix5, Effaced:100%Station:-1 iupc placed for ctx monitoring rn cannot monitor, pt comfortable with epidural,,, clear fluid noted in tubing of iupc.   Cat I fhr now, laboriong well

## 2021-12-01 NOTE — PROGRESS NOTES
Pelvic exam:  [id number], Effaced:100%Station:+!  Cat 1 fht will proceed with second stage anticipate vaginal delivery

## 2021-12-01 NOTE — ANESTHESIA PROCEDURE NOTES
Epidural Block    Patient location during procedure: OB  Start time: 12/1/2021 11:06 AM  End time: 12/1/2021 11:12 AM  Reason for block: labor epidural  Staffing  Performed: attending   Anesthesiologist: Brooks Granados MD  Preanesthetic Checklist  Completed: patient identified, IV checked, site marked, risks and benefits discussed, surgical consent, monitors and equipment checked, pre-op evaluation and timeout performed  Block Placement  Patient position: sitting  Prep: ChloraPrep  Sterility prep: cap, gloves, hand and mask  Sedation level: no sedation  Patient monitoring: continuous pulse oximetry, frequent blood pressure checks and heart rate  Approach: midline  Location: lumbar  Epidural  Loss of resistance technique: air  Guidance: landmark technique  Needle  Needle type: Tuohy   Needle gauge: 17 G  Needle length: 9 cm  Needle insertion depth: 6 cm  Catheter type: end hole  Catheter size: 19 G  Catheter at skin depth: 12 cm  Catheter securement method: surgical tape and clear occlusive dressing  Test dose: negative  Assessment  Block outcome: pain improved  Number of attempts: 1  Procedure assessment: patient tolerated procedure well with no immediate complications

## 2021-12-02 LAB
HCT VFR BLD AUTO: 30.5 % (ref 35–45)
HGB BLD-MCNC: 9.8 G/DL (ref 12–16)

## 2021-12-02 PROCEDURE — 85018 HEMOGLOBIN: CPT

## 2021-12-02 PROCEDURE — 74011250637 HC RX REV CODE- 250/637: Performed by: OBSTETRICS & GYNECOLOGY

## 2021-12-02 PROCEDURE — 36415 COLL VENOUS BLD VENIPUNCTURE: CPT

## 2021-12-02 PROCEDURE — 74011250637 HC RX REV CODE- 250/637: Performed by: STUDENT IN AN ORGANIZED HEALTH CARE EDUCATION/TRAINING PROGRAM

## 2021-12-02 PROCEDURE — 65270000029 HC RM PRIVATE

## 2021-12-02 RX ORDER — FLUCONAZOLE 100 MG/1
150 TABLET ORAL
Status: COMPLETED | OUTPATIENT
Start: 2021-12-02 | End: 2021-12-02

## 2021-12-02 RX ADMIN — IBUPROFEN 800 MG: 400 TABLET, FILM COATED ORAL at 07:39

## 2021-12-02 RX ADMIN — TERCONAZOLE 1 APPLICATOR: 4 CREAM VAGINAL at 08:46

## 2021-12-02 RX ADMIN — FLUCONAZOLE 150 MG: 100 TABLET ORAL at 19:56

## 2021-12-02 RX ADMIN — ACETAMINOPHEN 650 MG: 325 TABLET ORAL at 00:08

## 2021-12-02 RX ADMIN — IBUPROFEN 800 MG: 400 TABLET, FILM COATED ORAL at 17:11

## 2021-12-02 NOTE — OP NOTES
Delivery Note    Obstetrician: walter    Assistant: none    Pre-Delivery Diagnosis: Term pregnancy and Induced labor    Post-Delivery Diagnosis: Male    Intrapartum Event: None    Procedure: Spontaneous vaginal delivery    Epidural: YES    Monitor:  Fetal Heart Tones - External and Uterine Contractions - External    Indications for instrumental delivery: none    Estimated Blood Loss: 200cc    Episiotomy: none    Laceration(s):  Small hymenal tear 7 oclock    Laceration(s) repair: YES with 2-0 vicryl    Presentation: Cephalic    Fetal Description: milan    Fetal Position: Occiput Anterior    Birth Weight:     Birth Length:     Apgar - One Minute: 8    Apgar - Five Minutes: 9    Umbilical Cord: 3 vessels present    Specimens: none           Complications:  none           Cord Blood Results:   Information for the patient's :  Yoig Mcgarrygeoffrey Pending [[id number]]   No results found for: PCTABR, ABORH, PCTDIG, BILI, ABORH, ABORHEXT     Prenatal Labs:     Lab Results   Component Value Date/Time    ABO/Rh(D) A POSITIVE 2021 08:25 AM        Attending Attestation: I was present and scrubbed for the entire procedure    Signed By:  Bunny Zapata MD     2021

## 2021-12-02 NOTE — PROGRESS NOTES
- Bedside and Verbal shift change report given to FEDERICA Braden RN (oncoming nurse) by Natividad Shore RN (offgoing nurse). Report included the following information SBAR, Kardex, Intake/Output, MAR and Recent Results.  -  viable baby boy, placed skin to skin with mother  5 - Pitocin bolus   - Placenta delivered, discarded   - kenny care, pads and linen changed   - Assessment complete   - patient sitting up in bed, eating dinner   - Report given to JENI Larry RN

## 2021-12-02 NOTE — PROGRESS NOTES
2300 TRANSFER - IN REPORT:    Verbal report received from Sonny Adams. TABATHA (name) on [employer]  being received from labor and delivery (unit) for routine progression of care      Report consisted of patients Situation, Background, Assessment and   Recommendations(SBAR). Information from the following report(s) SBAR, Kardex, Intake/Output and MAR was reviewed with the receiving nurse. Opportunity for questions and clarification was provided. Assessment completed upon patients arrival to unit and care assumed. 2321 Pt. Joined at bedside by significant other and baby. AAOx4. Vital signs stable. Will continue to monitor. Pain 2/10. Educated on pain management. Denies need for pain medication. Whiteboard updated. Educated on plan of care and signs and symptoms to report. No further questions on concerns at this time. Fundus firm at U , small rubra lochia. No clots noted. Assessment complete. Callbell within reach. Bed in lowest position. 0012 Pt rated pain 3/10. Pain medication administered as ordered. Assisted to bathroom, steady gait. Urine output 300ml. Educated on and assisted with kenny care. Pt ambulated to bed. Call bell within reach, bed in lowest position. 0127 Assisted with breastfeeding. Infant latched using nipple shield. 5251 Pt fundus firm at U, small rubra lochia. Voided 300 mls. Needs and concers addressed. 0559 Pt breastfeeding at this time. Needs and concerns addressed. 0720 Bedside and Verbal shift change report given to Claudean Bair, RN  (oncoming nurse) by JENI Nascimento (offgoing nurse). Report included the following information SBAR, Kardex, Intake/Output, MAR and Recent Results.

## 2021-12-02 NOTE — PROGRESS NOTES
Problem: Patient Education: Go to Patient Education Activity  Goal: Patient/Family Education  Outcome: Progressing Towards Goal     Problem: Pain  Goal: *Control of Pain  Outcome: Progressing Towards Goal     Problem: Vaginal Delivery: Day of Delivery-Post delivery  Goal: Activity/Safety  Outcome: Progressing Towards Goal  Goal: Nutrition/Diet  Outcome: Progressing Towards Goal  Goal: Medications  Outcome: Progressing Towards Goal  Goal: Treatments/Interventions/Procedures  Outcome: Progressing Towards Goal  Goal: *Vital signs within defined limits  Outcome: Progressing Towards Goal  Goal: *Labs within defined limits  Outcome: Progressing Towards Goal  Goal: *Hemodynamically stable  Outcome: Progressing Towards Goal  Goal: *Optimal pain control at patient's stated goal  Outcome: Progressing Towards Goal  Goal: *Demonstrates progressive activity  Outcome: Progressing Towards Goal

## 2021-12-02 NOTE — PROGRESS NOTES
Progress Note    Patient: Kerrie Tran MRN: [id number]  SSN: [id number]    YOB: 1985  Age: 43 y.o. Sex: female    ROOM:  [facility]      Subjective:     Postpartum Day: 1            Delivery: vaginal delivery    The patient feels well. The patient denies emotional concerns. The baby is well. Baby is feeding via breast.  The patient is ambulating well. The patient  tolerating a normal diet. Flatus has been passed. Objective:      Patient Vitals for the past 24 hrs:   BP Temp Pulse Resp SpO2   12/02/21 0406 123/62 98.1 °F (36.7 °C) 82 18 100 %   12/01/21 2321 (!) 141/79 99.2 °F (37.3 °C) 91 18 98 %   12/01/21 2000 137/77 98.2 °F (36.8 °C) 88 18 --   12/01/21 1752 (!) 143/80 -- 84 -- --   12/01/21 1722 131/70 -- 84 -- --   12/01/21 1651 128/72 97.9 °F (36.6 °C) 84 -- --   12/01/21 1552 119/69 -- 84 -- --   12/01/21 1522 125/76 -- 78 -- --   12/01/21 1452 132/78 -- 89 -- --   12/01/21 1422 137/76 -- 83 -- --   12/01/21 1352 136/73 98.3 °F (36.8 °C) 70 -- --   12/01/21 1317 (!) 142/82 -- 77 -- --   12/01/21 1307 (!) 140/84 -- 70 -- --   12/01/21 1302 (!) 152/86 -- (!) 116 -- --   12/01/21 1257 (!) 140/86 -- 73 -- --   12/01/21 1251 137/82 -- 66 -- --   12/01/21 1247 129/80 -- 71 -- --   12/01/21 1128 122/81 98.2 °F (36.8 °C) (!) 173 -- --   12/01/21 1123 (!) 141/61 -- 89 -- --   12/01/21 1119 (!) 141/83 -- 93 -- --   12/01/21 1114 (!) 159/89 -- 99 -- --   12/01/21 0903 107/75 -- 84 -- --   12/01/21 0833 (!) 142/85 -- 86 -- --   12/01/21 0831 (!) 146/82 -- 83 -- --   12/01/21 0800 132/75 98.2 °F (36.8 °C) -- -- --   12/01/21 0648 135/81 -- 79 -- --     Lochia:  appropriate   Uterine Fundus:   firm   Fundus Location:  -2   Incision:      DVT Evaluation:  No evidence of DVT seen on physical exam.  Negative Karis's sign. No cords or calf tenderness. Calf/Ankle edema is present. Edema 2+ = and bilat     Lab/Data Review: All lab results for the last 24 hours reviewed.   LABS: Recent Results (from the past 50 hour(s))   HGB & HCT    Collection Time: 12/02/21  1:03 AM   Result Value Ref Range    HGB 9.8 (L) 12.0 - 16.0 g/dL    HCT 30.5 (L) 35.0 - 45.0 %        Assessment:     Status post: Doing well postpartum vaginal delivery     Plan:     Postpartum care discussed including diet, ambulation, and actvitiy restrictions. Discussed circumcision: Benefits are that it may be easier to keep clean and various ethnic and [employer] customs. Risks are bleeding most commonly, which is resolved with pressure or hemostatic gels or sponges. Scarring and infection are possible but extremely unlikely. It may not be be possible to remove all that we would like to remove if the shaft is short, as this would be more likely to lead to scarring. Wants circ done. Cannot do circ yet as has not PU'd. Discharge instructions and questions answered.        Signed By: Jia Scott MD     December 2, 2021

## 2021-12-02 NOTE — ANESTHESIA POSTPROCEDURE EVALUATION
12/2/2021  1:27 PM    Laboring Epidural Follow-up Note     Referring physician: Tammie Camarena MD   Patient status post vaginal delivery with labor epidural    Visit Vitals  BP (!) 148/89 (BP 1 Location: Right upper arm, BP Patient Position: At rest;Sitting)   Pulse 84   Temp 36.8 °C (98.2 °F)   Resp 18   Ht 5' 7\" (1.702 m)   Wt 119.3 kg (263 lb)   SpO2 100%   Breastfeeding Unknown   BMI 41.19 kg/m²       Epidural removed by L&D staff  No sedation, pruritis noted. Adequate analgesia.   No obvious anesthesia complications          Gerard Guerrero, CRNA

## 2021-12-02 NOTE — ROUTINE PROCESS
2245 Epidural catheter removed with blue tip visualized intact. Up to BR with minimal assistance. No urge to void at present. Marilu-care done per patient. Tolerated ambulation well,    2300 Transferred per W/C to mother baby unit.

## 2021-12-02 NOTE — PROGRESS NOTES
0720 Bedside and Verbal shift change report given to Jan Beckham RN (oncoming nurse) by JENI Chaudhary RN (offgoing nurse). Report included the following information SBAR, Kardex, Intake/Output, MAR and Recent Results. Pt educated on pain management. 0739 Pt C/O 3/10 lower abd cramping, PRN Ibuprofen administered as requested, will follow up on effectiveness. 5702 Assessment completed, pt states pain has subsided. +2 non-pitting edema to BLE, pt encouraged to elevate feet with pillows. Mom educated on breastfeeding basics--hunger cues, feeding on demand, waking baby if baby sleeps too long between feeds, importance of skin to skin, positioning and latching, risk of pacifier use and supplemental feedings, and importance of rooming in--and use of log sheet. Mom also educated on benefits of breastfeeding for herself and baby. Mom verbalized understanding. No questions at this time. 1711 Re-assessment completed, pt C/O 4/10 lower abd cramping, PRN Ibuprofen administered will follow up on effectiveness. 1832 Pt states pain has subsided. 1910 Bedside and Verbal shift change report given to JENI Chaudhary RN (oncoming nurse) by Jan Beckham RN (offgoing nurse). Report included the following information SBAR, Kardex, Intake/Output, MAR and Recent Results.

## 2021-12-03 VITALS
HEIGHT: 67 IN | SYSTOLIC BLOOD PRESSURE: 148 MMHG | BODY MASS INDEX: 41.28 KG/M2 | RESPIRATION RATE: 18 BRPM | HEART RATE: 88 BPM | TEMPERATURE: 98.2 F | OXYGEN SATURATION: 99 % | WEIGHT: 263 LBS | DIASTOLIC BLOOD PRESSURE: 85 MMHG

## 2021-12-03 PROCEDURE — 74011250637 HC RX REV CODE- 250/637: Performed by: OBSTETRICS & GYNECOLOGY

## 2021-12-03 RX ADMIN — IBUPROFEN 800 MG: 400 TABLET, FILM COATED ORAL at 03:26

## 2021-12-03 NOTE — PROGRESS NOTES
1910 Bedside and Verbal shift change report given to Shima Naqvi RN   (oncoming nurse) by Mauro Toney RN (offgoing nurse). Report included the following information SBAR, Kardex, Intake/Output, MAR and Recent Results. 2000 Pt. Joined at bedside by significant other and baby. AAOx4. Pain 1/10. Educated on pain management. Whiteboard updated. Educated on plan of care and signs and symptoms to report. No further questions on concerns at this time. Fundus firm at U -1, scant rubra lochia. No clots noted. Assessment complete. NT removed. Callbell within reach. Bed in lowest position. 2212 Rounding complete. Educated on breast milk as a natural laxative, and gas for infant. Needs and concern addressed. 0029 Rounding complete. Needs addressed. No further concerns expressed. 0329 Pt rated pain 4/10. Pain medication administered as ordered. Pt breastfeeding at this time. Educated on normal lochia, uterine contractions/crapming, and signs and symptoms to report. 0715 Bedside and Verbal shift change report given to TABATHA Chen (oncoming nurse) by JENI Hess (offgoing nurse). Report included the following information SBAR, Kardex, Intake/Output, MAR and Recent Results.

## 2021-12-03 NOTE — LACTATION NOTE
This note was copied from a baby's chart. 157 [facility] mom in the bathroom. Will return. 5 Mom educated on breastfeeding basics--hunger cues, feeding on demand, waking baby if baby sleeps too long between feeds, importance of skin to skin, positioning and latching, risk of pacifier use and supplemental feedings, and importance of rooming in--and use of log sheet. Mom also educated on benefits of breastfeeding for herself and baby. Mom verbalized understanding. No questions at this time. Per mom, infant latching and nursing well. DOL behaviors were discussed. Breastfeeding discharge teaching completed to include feeding on demand, foremilk and hindmilk importance, engorgement, mastitis, clogged ducts, pumping, breastmilk storage, and returning to work. Information given about unit and office phone numbers and encouraged mom to reach out if concerns arise, but that [facility] would be calling her in the next few days to follow up on breastfeeding. Mom verbalized understanding and no questions at this time. 1902 explained to mom how to set up and use personal breast pump. All questions and concerns were addressed.

## 2021-12-03 NOTE — DISCHARGE INSTRUCTIONS
POST DELIVERY DISCHARGE INSTRUCTIONS    Name: Tigist Wang  YOB: 1985  Primary Diagnosis: Active Problems:    41 weeks gestation of pregnancy (2021)      Obesity (2021)      Family history of systemic lupus erythematosus (2021)        General:     Diet/Diet Restrictions:  Eight 8-ounce glasses of fluid daily (water, juices); avoid excessive caffeine intake. Meals/snacks as desired which are high in fiber and carbohydrates and low in fat and cholesterol. Physical Activity / Restrictions / Safety:     Avoid heavy lifting, no more that 8 lbs. For 2-3 weeks; limit use of stairs to 2 times daily for the first week home. No driving for one week. Avoid intercourse 4-6 weeks, no douching or tampon use. Check with obstetrician before starting or resuming an exercise program.         Discharge Instructions/Special Treatment/Home Care Needs:     Continue prenatal vitamins. Continue to use squirt bottle with warm water on your episiotomy after each bathroom use until bleeding stops. If steri-strips applied to your incision, remove in 7-10 days. Call your doctor for the following:     Fever over 101 degrees by mouth. Vaginal bleeding heavier than a normal menstrual period or clot larger than a golf ball. Red streaks or increased swelling of legs, painful red streaks on your breast.  Painful urination, constipation and increased pain or swelling or discharge with your incision. If you feel extremely anxious or overwhelmed. If you have thoughts of harming yourself and/or your baby. Pain Management:     Pain Management:   Take Acetaminophen (Tylenol) or Ibuprofen (Advil, Motrin), as directed for pain. Use a warm Sitz bath 3 times daily to relieve episiotomy or hemorrhoidal discomfort. Heating pad to  incision as needed. For hemorrhoidal discomfort, use Tucks and Anusol cream as needed and directed. Follow-Up Care:      These are general instructions for a healthy lifestyle:    No smoking/ No tobacco products/ Avoid exposure to second hand smoke    Surgeon General's Warning:  Quitting smoking now greatly reduces serious risk to your health. Obesity, smoking, and sedentary lifestyle greatly increases your risk for illness    A healthy diet, regular physical exercise & weight monitoring are important for maintaining a healthy lifestyle    Recognize signs and symptoms of STROKE:    F-face looks uneven    A-arms unable to move or move unevenly    S-speech slurred or non-existent    T-time-call 911 as soon as signs and symptoms begin-DO NOT go       Back to bed or wait to see if you get better-TIME IS BRAIN. If you experience chest pain call 911. Do not drive yourself. Patient armband removed and given to patient to take home.   Patient was informed of the privacy risks if armband lost or stolen

## 2021-12-03 NOTE — DISCHARGE SUMMARY
Progress Note    Patient: Jennifer Keys MRN: [id number]  SSN: [id number]    YOB: 1985  Age: 43 y.o. Sex: female    ROOM:  [facility]/01      Subjective:     Postpartum Day: 2            Delivery: vaginal delivery    The patient feels well. The patient denies emotional concerns. The baby is well. Baby is feeding via breast.  The patient is ambulating well. The patient  tolerating a normal diet. Flatus has been passed. Objective:      Patient Vitals for the past 24 hrs:   BP Temp Pulse Resp SpO2   12/03/21 0036 111/75 98.5 °F (36.9 °C) 93 18 99 %   12/02/21 0802 (!) 148/89 98.2 °F (36.8 °C) 84 18 100 %     Lochia:  appropriate   Uterine Fundus:   firm   Fundus Location:  -3   Incision:      DVT Evaluation:  No evidence of DVT seen on physical exam.  Negative Karis's sign. No cords or calf tenderness. Calf/Ankle edema is present. Trace edema only, = and bilat     Lab/Data Review: All lab results for the last 24 hours reviewed. LABS: Recent Results (from the past 48 hour(s))   HGB & HCT    Collection Time: 12/02/21  1:03 AM   Result Value Ref Range    HGB 9.8 (L) 12.0 - 16.0 g/dL    HCT 30.5 (L) 35.0 - 45.0 %        Assessment:     Status post: Doing well postpartum vaginal delivery     Plan:     Postpartum care discussed including diet, ambulation, and actvitiy restrictions. Discharge instructions and questions answered. Home, f/u 6wks.        Signed By: Emilia Cintron MD     December 3, 2021

## 2021-12-03 NOTE — PROGRESS NOTES
0715 Bedside and Verbal shift change report given to Christiane Peters RN (oncoming nurse) by JENI Bell RN  (offgoing nurse). Report included the following information SBAR, Kardex, Intake/Output, MAR and Recent Results. Pt educated on pain management. 2701 Assessment completed, pt denies any pain at this time. Mom educated on breastfeeding basics--hunger cues, feeding on demand, waking baby if baby sleeps too long between feeds, importance of skin to skin, positioning and latching, risk of pacifier use and supplemental feedings, and importance of rooming in--and use of log sheet. Mom also educated on benefits of breastfeeding for herself and baby. Mom verbalized understanding. No questions at this time. 1000 AVS and discharge teachings reviewed and e-signed, arm bands verified and matching, mom discharged home with baby in car seat both in stable condition per [facility] RN.

## 2022-09-22 ENCOUNTER — TRANSCRIBE ORDER (OUTPATIENT)
Dept: REGISTRATION | Age: 37
End: 2022-09-22

## 2022-09-22 ENCOUNTER — HOSPITAL ENCOUNTER (OUTPATIENT)
Dept: LAB | Age: 37
Discharge: HOME | End: 2022-09-22
Payer: COMMERCIAL

## 2022-09-22 DIAGNOSIS — Z01.818 OTHER SPECIFIED PRE-OPERATIVE EXAMINATION: Primary | ICD-10-CM

## 2022-09-22 DIAGNOSIS — Z01.818 OTHER SPECIFIED PRE-OPERATIVE EXAMINATION: ICD-10-CM

## 2022-09-22 LAB
BASOPHILS # BLD: 0.1 K/UL (ref 0–0.1)
BASOPHILS NFR BLD: 1 % (ref 0–2)
CRP SERPL-MCNC: <0.3 MG/DL (ref 0–0.3)
DIFFERENTIAL METHOD BLD: NORMAL
EOSINOPHIL # BLD: 0.1 K/UL (ref 0–0.4)
EOSINOPHIL NFR BLD: 2 % (ref 0–5)
ERYTHROCYTE [DISTWIDTH] IN BLOOD BY AUTOMATED COUNT: 12.8 % (ref 11.6–14.5)
ERYTHROCYTE [SEDIMENTATION RATE] IN BLOOD: 17 MM/HR (ref 0–20)
HCT VFR BLD AUTO: 40.7 % (ref 35–45)
HGB BLD-MCNC: 13.7 G/DL (ref 12–16)
IMM GRANULOCYTES # BLD AUTO: 0 K/UL (ref 0–0.04)
IMM GRANULOCYTES NFR BLD AUTO: 0 % (ref 0–0.5)
LYMPHOCYTES # BLD: 1.9 K/UL (ref 0.9–3.6)
LYMPHOCYTES NFR BLD: 27 % (ref 21–52)
MCH RBC QN AUTO: 32.5 PG (ref 24–34)
MCHC RBC AUTO-ENTMCNC: 33.7 G/DL (ref 31–37)
MCV RBC AUTO: 96.7 FL (ref 78–100)
MONOCYTES # BLD: 0.4 K/UL (ref 0.05–1.2)
MONOCYTES NFR BLD: 6 % (ref 3–10)
NEUTS SEG # BLD: 4.4 K/UL (ref 1.8–8)
NEUTS SEG NFR BLD: 64 % (ref 40–73)
NRBC # BLD: 0 K/UL (ref 0–0.01)
NRBC BLD-RTO: 0 PER 100 WBC
PLATELET # BLD AUTO: 232 K/UL (ref 135–420)
PMV BLD AUTO: 10.2 FL (ref 9.2–11.8)
RBC # BLD AUTO: 4.21 M/UL (ref 4.2–5.3)
URATE SERPL-MCNC: 4.8 MG/DL (ref 2.6–7.2)
WBC # BLD AUTO: 6.8 K/UL (ref 4.6–13.2)

## 2022-09-22 PROCEDURE — 85598 HEXAGNAL PHOSPH PLTLT NEUTRL: CPT

## 2022-09-22 PROCEDURE — 36415 COLL VENOUS BLD VENIPUNCTURE: CPT

## 2022-09-22 PROCEDURE — 86140 C-REACTIVE PROTEIN: CPT

## 2022-09-22 PROCEDURE — 85652 RBC SED RATE AUTOMATED: CPT

## 2022-09-22 PROCEDURE — 84550 ASSAY OF BLOOD/URIC ACID: CPT

## 2022-09-22 PROCEDURE — 83520 IMMUNOASSAY QUANT NOS NONAB: CPT

## 2022-09-22 PROCEDURE — 85025 COMPLETE CBC W/AUTO DIFF WBC: CPT

## 2022-09-24 LAB
APTT HEX PL PPP: 3 SEC (ref 0–11)
COMMENT: NORMAL

## 2022-09-26 ENCOUNTER — HOSPITAL ENCOUNTER (OUTPATIENT)
Dept: LAB | Age: 37
Discharge: HOME | End: 2022-09-26
Payer: COMMERCIAL

## 2022-09-26 PROCEDURE — 88307 TISSUE EXAM BY PATHOLOGIST: CPT

## 2022-09-26 PROCEDURE — 88305 TISSUE EXAM BY PATHOLOGIST: CPT

## 2022-09-27 LAB
FAX TO INFO,FAXT: NORMAL
FAX TO NUMBER,FAXN: NORMAL